# Patient Record
Sex: MALE | Race: WHITE | NOT HISPANIC OR LATINO | URBAN - METROPOLITAN AREA
[De-identification: names, ages, dates, MRNs, and addresses within clinical notes are randomized per-mention and may not be internally consistent; named-entity substitution may affect disease eponyms.]

---

## 2024-03-28 ENCOUNTER — LAB REQUISITION (OUTPATIENT)
Dept: LAB | Facility: HOSPITAL | Age: 34
End: 2024-03-28
Payer: COMMERCIAL

## 2024-03-28 DIAGNOSIS — U07.1 COVID-19: ICD-10-CM

## 2024-03-28 DIAGNOSIS — R09.81 NASAL CONGESTION: ICD-10-CM

## 2024-03-28 PROCEDURE — 87635 SARS-COV-2 COVID-19 AMP PRB: CPT

## 2024-03-29 LAB — SARS-COV-2 RNA RESP QL NAA+PROBE: DETECTED

## 2024-08-21 ENCOUNTER — APPOINTMENT (OUTPATIENT)
Dept: PRIMARY CARE | Facility: CLINIC | Age: 34
End: 2024-08-21
Payer: COMMERCIAL

## 2024-08-21 VITALS
HEART RATE: 77 BPM | DIASTOLIC BLOOD PRESSURE: 82 MMHG | TEMPERATURE: 98.6 F | OXYGEN SATURATION: 99 % | HEIGHT: 73 IN | SYSTOLIC BLOOD PRESSURE: 126 MMHG | WEIGHT: 257 LBS | BODY MASS INDEX: 34.06 KG/M2

## 2024-08-21 DIAGNOSIS — E66.01 MORBID OBESITY (MULTI): Primary | ICD-10-CM

## 2024-08-21 DIAGNOSIS — Z83.49 FAMILY HISTORY OF HYPOTHYROIDISM: ICD-10-CM

## 2024-08-21 DIAGNOSIS — Z76.89 ENCOUNTER TO ESTABLISH CARE: ICD-10-CM

## 2024-08-21 PROCEDURE — 99204 OFFICE O/P NEW MOD 45 MIN: CPT | Performed by: STUDENT IN AN ORGANIZED HEALTH CARE EDUCATION/TRAINING PROGRAM

## 2024-08-21 PROCEDURE — 3008F BODY MASS INDEX DOCD: CPT | Performed by: STUDENT IN AN ORGANIZED HEALTH CARE EDUCATION/TRAINING PROGRAM

## 2024-08-21 RX ORDER — ACETAMINOPHEN 325 MG/1
TABLET ORAL EVERY 4 HOURS PRN
COMMUNITY
Start: 2020-12-16

## 2024-08-21 ASSESSMENT — ENCOUNTER SYMPTOMS
EYE PAIN: 0
CHEST TIGHTNESS: 0
SLEEP DISTURBANCE: 0
DIARRHEA: 0
FATIGUE: 0
ABDOMINAL PAIN: 0
CONSTIPATION: 0
SHORTNESS OF BREATH: 0
DIZZINESS: 0
CHILLS: 0
EYE ITCHING: 0
OCCASIONAL FEELINGS OF UNSTEADINESS: 0
DIFFICULTY URINATING: 0
LOSS OF SENSATION IN FEET: 0
DYSURIA: 0
FEVER: 0
JOINT SWELLING: 0
BACK PAIN: 1
UNEXPECTED WEIGHT CHANGE: 0
DEPRESSION: 0
PALPITATIONS: 0
MYALGIAS: 0
HEADACHES: 0
COUGH: 0

## 2024-08-21 ASSESSMENT — PATIENT HEALTH QUESTIONNAIRE - PHQ9
1. LITTLE INTEREST OR PLEASURE IN DOING THINGS: NOT AT ALL
SUM OF ALL RESPONSES TO PHQ9 QUESTIONS 1 AND 2: 0
2. FEELING DOWN, DEPRESSED OR HOPELESS: NOT AT ALL

## 2024-08-21 ASSESSMENT — PAIN SCALES - GENERAL: PAINLEVEL: 0-NO PAIN

## 2024-08-21 NOTE — PROGRESS NOTES
Subjective   Patient ID: Adali Davies is a 34 y.o. male who presents for [facility].  Mr. Davies presents today to [facility]. He has no significant past medical history and his only concerns include occasional back soreness and weight gain over the past 4-5 years.     Mr. Davies's back soreness has been intermittent over the past year or so. He says it feels like a pulled muscle and will hurt him the day after working out or lifting weights, however he has not changed his workout routine. The pain is in his upper to mid back and is relieved by rest. He is not in any pain today.    Mr. Davies also endorses weight gain over the past few years which he attributes to bad habits. He is interested in speaking with a dietician for strategies to lose weight.       Previous PCP: Does not remember    PAST MEDICAL HISTORY:  - As a teenager, had two unexplained syncopal episodes. EKG in 2020 showed a normal sinus rhythm, probable left atrial enlargment, and a nonspecific ST and T wave abnormality.    PAST SURGICAL HISTORY:  - Inguinal hernia repair as a baby  - [place] teeth removal    FAMILY HISTORY:  - Hx of stroke in maternal uncle, maternal grandmother, and paternal aunt  - Hx of kidney failure in mother and paternal grandmother   - Hx of hypothyroidism in mother  - Heart disease on both sides of the family  - Cousin on motherswith a heart attack in mid 50s    SOCIAL HISTORY:  Tobacco: No  ETOH: Occasional, about twice per month around glass or two  Drug: No  Sexual hx: Yes, 1 partner  Occupation: Dialysis technician    ALLERGIES:  - Eggs, dairy, and sulfa drugs  - Had a reaction to sulfa drugs as a kid but is unsure what happened    RECENT HOSPITALIZATIONS:  - No    RECENT PROCEDURES:  - No    PAST PREVENTATIVE CARE:  Not recently  -   Review of Systems   Constitutional:  Negative for chills, fatigue, fever and unexpected weight change.   HENT:  Negative for congestion, ear pain and hearing loss.    Eyes:  Negative  for pain, itching and visual disturbance.   Respiratory:  Negative for cough, chest tightness and shortness of breath.    Cardiovascular:  Negative for chest pain, palpitations and leg swelling.   Gastrointestinal:  Negative for abdominal pain, constipation and diarrhea.   Genitourinary:  Negative for difficulty urinating and dysuria.   Musculoskeletal:  Positive for back pain. Negative for joint swelling and myalgias.        No current pain, last bothered him a few months   Allergic/Immunologic: Negative for environmental allergies.   Neurological:  Negative for dizziness and headaches.   Psychiatric/Behavioral:  Negative for sleep disturbance.        Objective   Physical Exam  Constitutional:       Appearance: Normal appearance. He is obese.      Comments: Abdominal circumference 48 inches   Cardiovascular:      Rate and Rhythm: Normal rate and regular rhythm.      Pulses: Normal pulses.      Heart sounds: Normal heart sounds.   Pulmonary:      Effort: Pulmonary effort is normal.      Breath sounds: Normal breath sounds.   Musculoskeletal:         General: Normal range of motion.   Skin:     General: Skin is warm and dry.   Neurological:      General: No focal deficit present.      Mental Status: He is alert. Mental status is at baseline.   Psychiatric:         Mood and Affect: Mood normal.         Behavior: Behavior normal.         Thought Content: Thought content normal.         Judgment: Judgment normal.         Assessment/Plan   Mr. Davies is a 33 year old patient here to establish care. His primary concern today is referral to nutrition services to discuss weight loss strategies and back soreness. He is otherwise in good health and screening labs were ordered.     Diagnoses and all orders for this visit:  Morbid obesity (Multi)  -     Referral to Nutrition Services; Future  -     Lipid panel; Future  -     CBC; Future  -     Comprehensive metabolic panel; Future  -     Tsh With Reflex To Free T4 If Abnormal;  Future  Family history of hypothyroidism  -     CBC; Future  -     Tsh With Reflex To Free T4 If Abnormal; Future  Encounter to establish care  -     Lipid panel; Future  -     CBC; Future  -     Comprehensive metabolic panel; Future  -     HIV 1/2 Antigen/Antibody Screen with Reflex to Confirmation; Future  Intermittent back pain   - Not symptomatic today   - Recommended stretching before and after exercise      Plan to take over care which includes but not limited to:     -Conducting routine check-ups, physical exams, and health screenings to detect and prevent health problems  -Monitoring and managing chronic diseases   -Providing counseling and education on health promotion, disease prevention, and lifestyle modifications.  -Coordinating care with other healthcare providers to ensure continuity and quality of care.     Sudha Spencer, MS3    I was present with the medical student who participated in the documentation of this note.  I have personally seen and examined the patient and performed the medical decision-making components. I have reviewed the medical student documentation and/or resident documentation and verified the findings in the note as written with additions or exceptions as stated in the body of the note.    Lobito Ryan MD

## 2024-08-22 ENCOUNTER — LAB (OUTPATIENT)
Dept: LAB | Facility: LAB | Age: 34
End: 2024-08-22
Payer: COMMERCIAL

## 2024-08-22 DIAGNOSIS — Z83.49 FAMILY HISTORY OF HYPOTHYROIDISM: ICD-10-CM

## 2024-08-22 DIAGNOSIS — E66.01 MORBID OBESITY (MULTI): ICD-10-CM

## 2024-08-22 DIAGNOSIS — Z76.89 ENCOUNTER TO ESTABLISH CARE: ICD-10-CM

## 2024-08-22 LAB
ALBUMIN SERPL BCP-MCNC: 4.5 G/DL (ref 3.4–5)
ALP SERPL-CCNC: 56 U/L (ref 33–120)
ALT SERPL W P-5'-P-CCNC: 22 U/L (ref 10–52)
ANION GAP SERPL CALC-SCNC: 13 MMOL/L (ref 10–20)
AST SERPL W P-5'-P-CCNC: 22 U/L (ref 9–39)
BILIRUB SERPL-MCNC: 0.7 MG/DL (ref 0–1.2)
BUN SERPL-MCNC: 13 MG/DL (ref 6–23)
CALCIUM SERPL-MCNC: 9.8 MG/DL (ref 8.6–10.6)
CHLORIDE SERPL-SCNC: 103 MMOL/L (ref 98–107)
CHOLEST SERPL-MCNC: 199 MG/DL (ref 0–199)
CHOLESTEROL/HDL RATIO: 4
CO2 SERPL-SCNC: 30 MMOL/L (ref 21–32)
CREAT SERPL-MCNC: 0.86 MG/DL (ref 0.5–1.3)
EGFRCR SERPLBLD CKD-EPI 2021: >90 ML/MIN/1.73M*2
ERYTHROCYTE [DISTWIDTH] IN BLOOD BY AUTOMATED COUNT: 13.2 % (ref 11.5–14.5)
GLUCOSE SERPL-MCNC: 97 MG/DL (ref 74–99)
HCT VFR BLD AUTO: 45 % (ref 41–52)
HDLC SERPL-MCNC: 49.4 MG/DL
HGB BLD-MCNC: 14.7 G/DL (ref 13.5–17.5)
HIV 1+2 AB+HIV1 P24 AG SERPL QL IA: NONREACTIVE
LDLC SERPL CALC-MCNC: 134 MG/DL
MCH RBC QN AUTO: 30.2 PG (ref 26–34)
MCHC RBC AUTO-ENTMCNC: 32.7 G/DL (ref 32–36)
MCV RBC AUTO: 93 FL (ref 80–100)
NON HDL CHOLESTEROL: 150 MG/DL (ref 0–149)
NRBC BLD-RTO: 0 /100 WBCS (ref 0–0)
PLATELET # BLD AUTO: 255 X10*3/UL (ref 150–450)
POTASSIUM SERPL-SCNC: 4.6 MMOL/L (ref 3.5–5.3)
PROT SERPL-MCNC: 7.7 G/DL (ref 6.4–8.2)
RBC # BLD AUTO: 4.86 X10*6/UL (ref 4.5–5.9)
SODIUM SERPL-SCNC: 141 MMOL/L (ref 136–145)
TRIGL SERPL-MCNC: 78 MG/DL (ref 0–149)
TSH SERPL-ACNC: 1.62 MIU/L (ref 0.44–3.98)
VLDL: 16 MG/DL (ref 0–40)
WBC # BLD AUTO: 5.6 X10*3/UL (ref 4.4–11.3)

## 2024-08-22 PROCEDURE — 80061 LIPID PANEL: CPT

## 2024-08-22 PROCEDURE — 80053 COMPREHEN METABOLIC PANEL: CPT

## 2024-08-22 PROCEDURE — 87389 HIV-1 AG W/HIV-1&-2 AB AG IA: CPT

## 2024-08-22 PROCEDURE — 84443 ASSAY THYROID STIM HORMONE: CPT

## 2024-08-22 PROCEDURE — 85027 COMPLETE CBC AUTOMATED: CPT

## 2024-08-22 PROCEDURE — 36415 COLL VENOUS BLD VENIPUNCTURE: CPT

## 2024-08-26 NOTE — PROGRESS NOTES
I was present with the medical student who participated in the documentation of this note.  I have personally seen and examined the patient and performed the medical decision-making components. I have reviewed the medical student documentation and/or resident documentation and verified the findings in the note as written with additions or exceptions as stated in the body of the note.    Lobito Ryan MD

## 2024-11-15 ENCOUNTER — APPOINTMENT (OUTPATIENT)
Dept: ENDOCRINOLOGY | Facility: CLINIC | Age: 34
End: 2024-11-15
Payer: COMMERCIAL

## 2024-12-12 ENCOUNTER — APPOINTMENT (OUTPATIENT)
Dept: NUTRITION | Facility: CLINIC | Age: 34
End: 2024-12-12
Payer: COMMERCIAL

## 2024-12-12 DIAGNOSIS — E66.01 MORBID OBESITY (MULTI): ICD-10-CM

## 2024-12-12 PROCEDURE — 97802 MEDICAL NUTRITION INDIV IN: CPT | Performed by: DIETITIAN, REGISTERED

## 2024-12-12 NOTE — PROGRESS NOTES
"Nutrition Assessment     Reason for Visit:  Adali Davies is a 34 y.o. male who presents for Obesity.  Pt scheduled for a audio/visual virtual appointment. Identification was verified with 2 sources of identification. Patient was in [place] at time of visit.  HIPAA protocol was maintained.     Anthropometrics:   as of 8/21/2024  BMI:  33.91 kg/m² Abnormal   Height:  1.854 m (6' 1\")    Weight:  117 kg (257 lb)      Significant Past Medical Hx:  pt reports is otherwise healthy    Biochemical/Laboratory Data:  Lab Results   Component Value Date    CHOL 199 08/22/2024    TRIG 78 08/22/2024      Pertinent Medications: none    Pertinent Social Hx:  lives w/ partner who does much of the cooking and shopping    Food And Nutrient Intake:  Food and Nutrient History  Food and Nutrient History: Met w/ pt to obtain diet hx and instruct on diet strategies for weight loss d/t obesity.  Pt reports long term h/o gaining and losing weight.  Feels that current variable work schedule has hindered him in setting up routine eating habits.  Diet hx indicates skipping meals, imbalance of food groups at meals, reported often eating until 'stuffed' and frequent snacking on low nutritional value foods.  Fluid Intake: water, occasional diet soda  Food Allergy: none  Food Intolerance: eggs and dairy     Food Intake  Meal 1: skips  Meal 2: @ work goes to cafe and gets salad bar and/or soup - if packs will bring hummus/baba w/ raúl or vegs  OR salad w/ protein OR pkg of ramen noodles;  feels that potlucks at work often lean to overeating  Meal 3: partner makes dinner and is vegan (pt is not) - stir orellana with tofu and vegs  Snacks: nuts (large amounts), protein bar, sweets    Food Preparation  Cooking: Spouse/Significant Other  Grocery Shopping: Spouse/Significant Other  Dining Out: 1 to 3 times a week  Grocery Shopping Schedule: Planned weekly shopping(by partners)  Convenience/Processed Foods: Canned Soup, Prepared Foods from Grocery Store, " Processed/Frozen Convenience Meals, Processed Salty Snacks, and Processed Sweet Snacks  daily  Cooking Skills/Barriers: Limited cooking skills and Low preference for cooking(patient)  Meal Preparation Habits: Limited cooking skills and Little preplanning of meals  Eating Out Type: Dinner, Restaurant, and Take Out   General Food Category Consumption Habits: Rarely/Occasionally eats fruit, Typically has vegetables with meals., Tends to have meals with larger portions of grains/starches, Tries to choose leaner cuts of meat, Does not drink milk, Consumes sweets a couple times/week, and Rarely consumes sugar sweetened beverages  Relationship with Food:   Appetite:   Low in the morning, higher later in the day and Patient is disconnected from appetite signals  Mindful Eating Habits: Periods of restriction then overeating, Rarely thinks about feeling of satiety when eating, and Reports frequently eating until 'stuffed'    Physical Activities:  Physical Activity  Physical Activity History: reports does weight training 3-4x/wkk and cardion 1-2x/wk (waks on readmill for 30 min)    Nutrition Diagnosis      Nutrition Diagnosis  Patient has Nutrition Diagnosis: Yes  Diagnosis Status (1): New  Nutrition Diagnosis 1: Obese  Related to (1): energy intake in excess of energy expenditure  As Evidenced by (1): diet hx and BMI 33    Nutrition Interventions/Recommendations   Food and Nutrition Delivery  Meals & Snacks: General Healthful Diet, Energy-modified diet  Nutrition Education  Nutrition Education Content: Content related nutrition education  Goals: 1) Will add breakfast daily that includes at least 20 grams of protein 2) Will practice more mindfulness-paying attention to fullness at meals  3) Will aim for more balanced meals/snacks that include protein and fiber - thing MyPlate  Reviewed w/ pt strategies for weight loss including:  benefits of consistent meal and snack times;  mindful eating and paying attention to hunger and  fullness cues;  MyPlate guidance for portions sizes;  strategies to increase fruits and vegetables in diet;  strategies for limiting low nutritional value foods; benefits of, and strategies for, menu planning and prepping meals ahead of meal times; benefits of fiber and protein with meals and snacks; benefits of regular movement/exercise.     Nutrition Monitoring and Evaluation   Food/Nutrient Related History Monitoring  Monitoring and Evaluation Plan: Meal/snack pattern, Amount of food  Amount of Food: Estimated amout of food, Types of food  Criteria: meal are more balanced and include lean protein and produce  Meal/Snack Pattern: Estimated meal and snack pattern, Food variety  Criteria: snacks include protein and fiber  Body Composition/Growth/Weight History  Monitoring and Evaluation Plan: Weight  Weight: Measured weight  Criteria: weight loss of 3-8#/month

## 2024-12-12 NOTE — PATIENT INSTRUCTIONS
Nutrition Education Material: Achieving a Healthy Weight, AND: Smart Tips to Power Up with Breakfast, Mindful Eating, and NCM: Weight Loss Tips  Provided patient with my office phone # and email address for any further questions.

## 2025-01-16 ENCOUNTER — APPOINTMENT (OUTPATIENT)
Dept: NUTRITION | Facility: CLINIC | Age: 35
End: 2025-01-16
Payer: COMMERCIAL

## 2025-07-03 ENCOUNTER — OFFICE VISIT (OUTPATIENT)
Facility: HOSPITAL | Age: 35
End: 2025-07-03
Payer: COMMERCIAL

## 2025-07-03 VITALS
BODY MASS INDEX: 32.13 KG/M2 | OXYGEN SATURATION: 97 % | WEIGHT: 242.4 LBS | DIASTOLIC BLOOD PRESSURE: 70 MMHG | SYSTOLIC BLOOD PRESSURE: 117 MMHG | HEART RATE: 84 BPM | HEIGHT: 73 IN | TEMPERATURE: 97.7 F

## 2025-07-03 DIAGNOSIS — Z00.00 ROUTINE GENERAL MEDICAL EXAMINATION AT A HEALTH CARE FACILITY: ICD-10-CM

## 2025-07-03 DIAGNOSIS — K21.9 GASTROESOPHAGEAL REFLUX DISEASE WITHOUT ESOPHAGITIS: Primary | ICD-10-CM

## 2025-07-03 PROCEDURE — 3008F BODY MASS INDEX DOCD: CPT | Performed by: STUDENT IN AN ORGANIZED HEALTH CARE EDUCATION/TRAINING PROGRAM

## 2025-07-03 PROCEDURE — 99395 PREV VISIT EST AGE 18-39: CPT | Performed by: STUDENT IN AN ORGANIZED HEALTH CARE EDUCATION/TRAINING PROGRAM

## 2025-07-03 RX ORDER — FAMOTIDINE 20 MG/1
20 TABLET, FILM COATED ORAL 2 TIMES DAILY
Qty: 60 TABLET | Refills: 5 | Status: SHIPPED | OUTPATIENT
Start: 2025-07-03 | End: 2025-12-30

## 2025-07-03 ASSESSMENT — PROMIS GLOBAL HEALTH SCALE
RATE_PHYSICAL_HEALTH: VERY GOOD
RATE_AVERAGE_PAIN: 0
EMOTIONAL_PROBLEMS: RARELY
CARRYOUT_SOCIAL_ACTIVITIES: VERY GOOD
RATE_QUALITY_OF_LIFE: EXCELLENT
CARRYOUT_PHYSICAL_ACTIVITIES: COMPLETELY
RATE_GENERAL_HEALTH: GOOD
RATE_SOCIAL_SATISFACTION: VERY GOOD
RATE_MENTAL_HEALTH: EXCELLENT

## 2025-07-03 ASSESSMENT — ENCOUNTER SYMPTOMS
FATIGUE: 0
CONSTIPATION: 0
JOINT SWELLING: 0
DIARRHEA: 0
BACK PAIN: 0
BLOOD IN STOOL: 0
COUGH: 0
CHILLS: 0
VOMITING: 0
ANAL BLEEDING: 0
ABDOMINAL PAIN: 0
SHORTNESS OF BREATH: 0
NAUSEA: 1
MYALGIAS: 0
ABDOMINAL DISTENTION: 0
FEVER: 0
CHOKING: 0
UNEXPECTED WEIGHT CHANGE: 0
DIAPHORESIS: 0
APPETITE CHANGE: 1
WHEEZING: 0
PALPITATIONS: 0

## 2025-07-03 ASSESSMENT — PAIN SCALES - GENERAL: PAINLEVEL_OUTOF10: 0-NO PAIN

## 2025-07-03 NOTE — PROGRESS NOTES
"Subjective   Patient ID: Adali Davies is a 34 y.o. male who presents for Establish Care (GI  problems ).    HPI     Abdominal Pain  - Patient reported that he has been cramping abdominal pain that feels like gas, patient said it is all around his abdomen but worst in the epigastric region. Pain started around 3 days ago.  - Denies any recent changes in his diet; of note, patient reports that he has experienced some congestion when consuming diary so he typically avoids it in his diet.  - BM have been daily and regular, no abnormal stools, loose, dark, or bright red stools.  - Pertinent positives include a decrease in appetite, nausea, cramping, increased flatulence.   - Pertinent negatives include no vomiting, diarrhea, dizziness, and weight loss.   - Reports pain does not change before or after eating.   - Denies smoking cigarettes, drink alcohol periodically (~ once per month), does not smoke marajuana or use any other recreational drugs. Says that he last took Aleve 2 weeks ago    Review of Systems   Constitutional:  Positive for appetite change. Negative for chills, diaphoresis, fatigue, fever and unexpected weight change.   Respiratory:  Negative for cough, choking, shortness of breath and wheezing.    Cardiovascular:  Negative for chest pain and palpitations.   Gastrointestinal:  Positive for nausea. Negative for abdominal distention, abdominal pain, anal bleeding, blood in stool, constipation, diarrhea and vomiting.   Musculoskeletal:  Negative for back pain, joint swelling and myalgias.       Objective   /70 (BP Location: Right arm, Patient Position: Sitting, BP Cuff Size: Adult)   Pulse 84   Temp 36.5 °C (97.7 °F) (Temporal)   Ht 1.854 m (6' 1\")   Wt 110 kg (242 lb 6.4 oz)   SpO2 97%   BMI 31.98 kg/m²     Physical Exam  Constitutional:       Appearance: Normal appearance.   HENT:      Mouth/Throat:      Mouth: Mucous membranes are moist.      Pharynx: Oropharynx is clear.      Tonsils: No " tonsillar exudate.   Eyes:      Extraocular Movements: Extraocular movements intact.      Pupils: Pupils are equal, round, and reactive to light.   Cardiovascular:      Rate and Rhythm: Normal rate and regular rhythm.      Heart sounds: Normal heart sounds, S1 normal and S2 normal.   Pulmonary:      Effort: Pulmonary effort is normal.      Breath sounds: Normal breath sounds and air entry.   Abdominal:      General: Abdomen is flat. Bowel sounds are normal.      Palpations: Abdomen is soft.      Tenderness: There is no abdominal tenderness.   Musculoskeletal:      Right lower leg: No edema.      Left lower leg: No edema.   Neurological:      General: No focal deficit present.      Deep Tendon Reflexes:      Reflex Scores:       Patellar reflexes are 2+ on the right side and 2+ on the left side.  Psychiatric:         Behavior: Behavior is cooperative.         Assessment/Plan     Gastroesophageal Reflux Disease without Esophagitis  :: At this time, abdominal cramping and pain has an unclear etiology  :: Based on patient history, it is unlikely that this abdominal pain is secondary to some infectious cause, possible gastric or duodenal ulcers  :: Most concerning etiology would be some cardiac abnormality, but this is unlikely given time frame of abdominal pain and lack of other cardiac related symptoms  :: Most likely, pain is secondary to GERD, but could be due to some other food intolerance   :: Discussed with patient possible elimination of various foods from his diet and potentially following the FOD-MAPS diet, patient expressed understanding   :: Patient reported that there is some heart burn which could explain some of the pain  PLAN:  - Start famotidiine (Pepcid) 20 mg tablet  - Referral to Gastroenterology    Health Maintenance  - Lipid Panel ordered      Follow-Up: One year      Mukul Bae  MS3, [employer]          documentation of this note.  I have personally seen and examined the patient and performed the medical decision-making components. I have reviewed the medical student documentation and/or resident documentation and verified the findings in the note as written with additions or exceptions as stated in the body of the note.    Lobito Ryan MD

## 2025-09-02 ENCOUNTER — OFFICE VISIT (OUTPATIENT)
Dept: URGENT CARE | Age: 35
End: 2025-09-02
Payer: COMMERCIAL

## 2025-09-02 VITALS
HEIGHT: 73 IN | RESPIRATION RATE: 19 BRPM | OXYGEN SATURATION: 98 % | BODY MASS INDEX: 34.06 KG/M2 | HEART RATE: 73 BPM | DIASTOLIC BLOOD PRESSURE: 75 MMHG | SYSTOLIC BLOOD PRESSURE: 126 MMHG | WEIGHT: 257 LBS

## 2025-09-02 DIAGNOSIS — B36.9 FUNGAL DERMATITIS: Primary | ICD-10-CM

## 2025-09-02 RX ORDER — FLUCONAZOLE 150 MG/1
150 TABLET ORAL WEEKLY
Qty: 4 TABLET | Refills: 0 | Status: SHIPPED | OUTPATIENT
Start: 2025-09-02 | End: 2025-09-24

## 2025-09-02 ASSESSMENT — ENCOUNTER SYMPTOMS: CONSTITUTIONAL NEGATIVE: 1
